# Patient Record
Sex: FEMALE | Race: ASIAN | NOT HISPANIC OR LATINO | URBAN - METROPOLITAN AREA
[De-identification: names, ages, dates, MRNs, and addresses within clinical notes are randomized per-mention and may not be internally consistent; named-entity substitution may affect disease eponyms.]

---

## 2017-05-15 ENCOUNTER — OUTPATIENT (OUTPATIENT)
Dept: OUTPATIENT SERVICES | Facility: HOSPITAL | Age: 42
LOS: 1 days | End: 2017-05-15
Payer: COMMERCIAL

## 2017-05-15 ENCOUNTER — APPOINTMENT (OUTPATIENT)
Dept: MRI IMAGING | Facility: HOSPITAL | Age: 42
End: 2017-05-15

## 2017-05-15 ENCOUNTER — APPOINTMENT (OUTPATIENT)
Dept: INTERVENTIONAL RADIOLOGY/VASCULAR | Facility: HOSPITAL | Age: 42
End: 2017-05-15

## 2017-05-15 DIAGNOSIS — D25.9 LEIOMYOMA OF UTERUS, UNSPECIFIED: ICD-10-CM

## 2017-05-15 DIAGNOSIS — Z80.3 FAMILY HISTORY OF MALIGNANT NEOPLASM OF BREAST: ICD-10-CM

## 2017-05-15 DIAGNOSIS — Z87.19 PERSONAL HISTORY OF OTHER DISEASES OF THE DIGESTIVE SYSTEM: ICD-10-CM

## 2017-05-15 DIAGNOSIS — Z78.9 OTHER SPECIFIED HEALTH STATUS: ICD-10-CM

## 2017-05-15 DIAGNOSIS — I10 ESSENTIAL (PRIMARY) HYPERTENSION: ICD-10-CM

## 2017-05-15 DIAGNOSIS — K26.3 ACUTE DUODENAL ULCER W/OUT HEMORRHAGE OR PERFORATION: ICD-10-CM

## 2017-05-15 DIAGNOSIS — Z98.89 OTHER SPECIFIED POSTPROCEDURAL STATES: Chronic | ICD-10-CM

## 2017-05-15 DIAGNOSIS — M54.2 CERVICALGIA: ICD-10-CM

## 2017-05-15 DIAGNOSIS — N83.20 UNSPECIFIED OVARIAN CYSTS: Chronic | ICD-10-CM

## 2017-05-15 DIAGNOSIS — Z87.09 PERSONAL HISTORY OF OTHER DISEASES OF THE RESPIRATORY SYSTEM: ICD-10-CM

## 2017-05-15 DIAGNOSIS — Z87.42 PERSONAL HISTORY OF OTHER DISEASES OF THE FEMALE GENITAL TRACT: ICD-10-CM

## 2017-05-15 DIAGNOSIS — Z82.49 FAMILY HISTORY OF ISCHEMIC HEART DISEASE AND OTHER DISEASES OF THE CIRCULATORY SYSTEM: ICD-10-CM

## 2017-05-15 DIAGNOSIS — Z86.018 PERSONAL HISTORY OF OTHER BENIGN NEOPLASM: ICD-10-CM

## 2017-05-15 PROCEDURE — 72197 MRI PELVIS W/O & W/DYE: CPT | Mod: 26

## 2017-05-15 PROCEDURE — 72197 MRI PELVIS W/O & W/DYE: CPT

## 2017-05-15 RX ORDER — NAPROXEN 500 MG/1
500 TABLET ORAL
Refills: 0 | Status: ACTIVE | COMMUNITY

## 2017-05-15 RX ORDER — RANITIDINE HYDROCHLORIDE 300 MG/1
300 TABLET, FILM COATED ORAL
Refills: 0 | Status: ACTIVE | COMMUNITY

## 2017-05-15 RX ORDER — HYDROXYZINE HCL 25 MG
25 TABLET ORAL
Refills: 0 | Status: ACTIVE | COMMUNITY

## 2017-05-15 RX ORDER — GLUCOSAMINE SULFATE 500 MG
500 CAPSULE ORAL
Refills: 0 | Status: ACTIVE | COMMUNITY

## 2017-05-15 RX ORDER — HYDROCHLOROTHIAZIDE 25 MG/1
25 TABLET ORAL DAILY
Refills: 0 | Status: ACTIVE | COMMUNITY

## 2017-05-15 RX ORDER — CETIRIZINE HCL 10 MG
10 TABLET ORAL
Refills: 0 | Status: ACTIVE | COMMUNITY

## 2017-05-15 RX ORDER — NIFEDIPINE 90 MG/1
90 TABLET, EXTENDED RELEASE ORAL
Refills: 0 | Status: ACTIVE | COMMUNITY

## 2017-05-15 RX ORDER — ESOMEPRAZOLE MAGNESIUM 40 MG/1
40 CAPSULE, DELAYED RELEASE ORAL
Refills: 0 | Status: ACTIVE | COMMUNITY

## 2017-05-15 RX ORDER — CHLORTHALIDONE 25 MG/1
25 TABLET ORAL
Refills: 0 | Status: ACTIVE | COMMUNITY

## 2017-05-15 RX ORDER — METOPROLOL SUCCINATE 50 MG/1
50 TABLET, EXTENDED RELEASE ORAL
Refills: 0 | Status: ACTIVE | COMMUNITY

## 2017-06-13 ENCOUNTER — LABORATORY RESULT (OUTPATIENT)
Age: 42
End: 2017-06-13

## 2017-06-15 ENCOUNTER — OUTPATIENT (OUTPATIENT)
Dept: OUTPATIENT SERVICES | Facility: HOSPITAL | Age: 42
LOS: 1 days | End: 2017-06-15
Payer: COMMERCIAL

## 2017-06-15 ENCOUNTER — APPOINTMENT (OUTPATIENT)
Dept: INTERVENTIONAL RADIOLOGY/VASCULAR | Facility: HOSPITAL | Age: 42
End: 2017-06-15

## 2017-06-15 DIAGNOSIS — Z98.89 OTHER SPECIFIED POSTPROCEDURAL STATES: Chronic | ICD-10-CM

## 2017-06-15 DIAGNOSIS — D25.9 LEIOMYOMA OF UTERUS, UNSPECIFIED: ICD-10-CM

## 2017-06-15 DIAGNOSIS — N83.20 UNSPECIFIED OVARIAN CYSTS: Chronic | ICD-10-CM

## 2017-06-15 PROCEDURE — C1769: CPT

## 2017-06-15 PROCEDURE — 75774 ARTERY X-RAY EACH VESSEL: CPT

## 2017-06-15 PROCEDURE — 36200 PLACE CATHETER IN AORTA: CPT

## 2017-06-15 PROCEDURE — C1894: CPT

## 2017-06-15 PROCEDURE — 76937 US GUIDE VASCULAR ACCESS: CPT

## 2017-06-15 PROCEDURE — 75894 X-RAYS TRANSCATH THERAPY: CPT

## 2017-06-15 PROCEDURE — C1887: CPT

## 2017-06-15 PROCEDURE — C1760: CPT

## 2017-06-15 PROCEDURE — 76000 FLUOROSCOPY <1 HR PHYS/QHP: CPT

## 2017-06-15 RX ORDER — ONDANSETRON 8 MG/1
1 TABLET, FILM COATED ORAL
Qty: 16 | Refills: 0 | OUTPATIENT
Start: 2017-06-15

## 2017-06-15 RX ORDER — KETOROLAC TROMETHAMINE 30 MG/ML
1 SYRINGE (ML) INJECTION
Qty: 20 | Refills: 0 | OUTPATIENT
Start: 2017-06-15 | End: 2017-06-20

## 2017-06-15 RX ORDER — ONDANSETRON 8 MG/1
1 TABLET, FILM COATED ORAL
Qty: 20 | Refills: 0 | OUTPATIENT
Start: 2017-06-15 | End: 2017-06-20

## 2017-06-15 RX ORDER — OXYCODONE HYDROCHLORIDE 5 MG/1
1 TABLET ORAL
Qty: 30 | Refills: 0 | OUTPATIENT
Start: 2017-06-15 | End: 2017-06-20

## 2017-06-15 RX ORDER — OXYCODONE HYDROCHLORIDE 5 MG/1
1 TABLET ORAL
Qty: 20 | Refills: 0 | OUTPATIENT
Start: 2017-06-15

## 2017-06-15 NOTE — ASU DISCHARGE PLAN (ADULT/PEDIATRIC). - INSTRUCTIONS
Drink plenty of fluid over the next several days to prevent constipation. If constipation then take Colace

## 2017-06-15 NOTE — ASU DISCHARGE PLAN (ADULT/PEDIATRIC). - MEDICATION SUMMARY - MEDICATIONS TO TAKE
I will START or STAY ON the medications listed below when I get home from the hospital:    Fioricet oral tablet  -- 1 tab(s) by mouth every 12 hours  -- Indication: For as directed     oxyCODONE 5 mg oral tablet  -- 1 tab(s) by mouth every 4 hours, As Needed -for moderate pain MDD:6  -- Caution federal law prohibits the transfer of this drug to any person other  than the person for whom it was prescribed.  It is very important that you take or use this exactly as directed.  Do not skip doses or discontinue unless directed by your doctor.  May cause drowsiness.  Alcohol may intensify this effect.  Use care when operating dangerous machinery.  This prescription cannot be refilled.  Using more of this medication than prescribed may cause serious breathing problems.    -- Indication: For s/p uterine fibroid embolisation for moderate pain     ketorolac 10 mg oral tablet  -- 1 tab(s) by mouth every 6 hours MDD:4  -- It is very important that you take or use this exactly as directed.  Do not skip doses or discontinue unless directed by your doctor.  May cause drowsiness or dizziness.  Obtain medical advice before taking any non-prescription drugs as some may affect the action of this medication.  Take with food or milk.    -- Indication: For for moderate pain     ondansetron 4 mg oral tablet, disintegrating  -- 1 tab(s) by mouth every 6 hours, As Needed -for nausea MDD:4  -- Indication: For as directed     ALPRAZolam 0.25 mg oral tablet  -- 1 tab(s) by mouth every 12 hours, As Needed  -- Indication: For as directed     metoprolol tartrate 50 mg oral tablet  -- 1 tab(s) by mouth once a day  -- Indication: For as directed     NIFEdipine 90 mg oral tablet, extended release  -- 1 tab(s) by mouth once a day  -- Indication: For as directed     Zantac 150 oral tablet  -- 1 tab(s) by mouth once a day  -- Indication: For as directed     NexIUM 40 mg oral delayed release capsule  -- 1 cap(s) by mouth once a day  -- Indication: For as directed

## 2017-06-15 NOTE — ASU DISCHARGE PLAN (ADULT/PEDIATRIC). - NOTIFY
Bleeding that does not stop/foul smelling vaginal discharge , pelvic pain if not relieved by meds/Increased Irritability or Sluggishness/GYN Fever>100.4/Numbness, color, or temperature change to extremity/Pain not relieved by Medications/Persistent Nausea and Vomiting/Fever greater than 101/Unable to Urinate

## 2017-06-15 NOTE — ASU DISCHARGE PLAN (ADULT/PEDIATRIC). - ACTIVITY LEVEL
no intercourse/no tampons/no douching/no weight bearing/nothing per vagina/no exercise/no tub baths/no heavy lifting/for 4 weeks

## 2017-06-15 NOTE — ASU DISCHARGE PLAN (ADULT/PEDIATRIC). - SPECIAL INSTRUCTIONS
pelvic pain should gradually improve over the next 4-7 days but may continue for a few weeks. low grade fever, night sweats , irregular bleeding, passage of tissue and non- foul smelling vaginal discharge are common following UAE. Some women skip a period or two after the procedure or have mid cycle vaginal bleeding. By the time you return for your four month follow up visit with IR your cycles will have likely returned to normal

## 2017-06-15 NOTE — ASU DISCHARGE PLAN (ADULT/PEDIATRIC). - ITEMS TO FOLLOWUP WITH YOUR PHYSICIAN'S
please follow up with GYN in one months after Uterine embolisation procedure. Please follow up with IR,  Dr. Raisa pereira 4 months after the procedure to assess your progress and address any issues you may have . You can schedule this visit by calling us at .

## 2017-07-07 RX ORDER — OXYCODONE HYDROCHLORIDE 5 MG/1
1 TABLET ORAL
Qty: 10 | Refills: 0 | OUTPATIENT
Start: 2017-07-07

## 2017-07-07 RX ORDER — OXYCODONE HYDROCHLORIDE 5 MG/1
1 TABLET ORAL
Qty: 10 | Refills: 0 | OUTPATIENT
Start: 2017-07-07 | End: 2017-07-10

## 2020-01-02 ENCOUNTER — EMERGENCY (EMERGENCY)
Facility: HOSPITAL | Age: 45
LOS: 1 days | Discharge: HOME | End: 2020-01-02
Attending: EMERGENCY MEDICINE
Payer: COMMERCIAL

## 2020-01-02 VITALS
WEIGHT: 117.95 LBS | SYSTOLIC BLOOD PRESSURE: 118 MMHG | TEMPERATURE: 98 F | RESPIRATION RATE: 17 BRPM | OXYGEN SATURATION: 99 % | DIASTOLIC BLOOD PRESSURE: 79 MMHG | HEART RATE: 102 BPM | HEIGHT: 59 IN

## 2020-01-02 VITALS
SYSTOLIC BLOOD PRESSURE: 124 MMHG | TEMPERATURE: 98 F | OXYGEN SATURATION: 100 % | HEART RATE: 88 BPM | RESPIRATION RATE: 19 BRPM | DIASTOLIC BLOOD PRESSURE: 70 MMHG

## 2020-01-02 DIAGNOSIS — Z98.89 OTHER SPECIFIED POSTPROCEDURAL STATES: Chronic | ICD-10-CM

## 2020-01-02 DIAGNOSIS — N83.20 UNSPECIFIED OVARIAN CYSTS: Chronic | ICD-10-CM

## 2020-01-02 LAB
ALBUMIN SERPL ELPH-MCNC: 4.2 G/DL — SIGNIFICANT CHANGE UP (ref 3.5–5)
ALP SERPL-CCNC: 126 U/L — HIGH (ref 40–120)
ALT FLD-CCNC: 85 U/L DA — HIGH (ref 10–60)
ANION GAP SERPL CALC-SCNC: 8 MMOL/L — SIGNIFICANT CHANGE UP (ref 5–17)
ANISOCYTOSIS BLD QL: SIGNIFICANT CHANGE UP
APPEARANCE UR: CLEAR — SIGNIFICANT CHANGE UP
AST SERPL-CCNC: 45 U/L — HIGH (ref 10–40)
BACTERIA # UR AUTO: ABNORMAL /HPF
BASOPHILS # BLD AUTO: 0 K/UL — SIGNIFICANT CHANGE UP (ref 0–0.2)
BASOPHILS # BLD AUTO: 0.05 K/UL — SIGNIFICANT CHANGE UP (ref 0–0.2)
BASOPHILS NFR BLD AUTO: 0 % — SIGNIFICANT CHANGE UP (ref 0–2)
BASOPHILS NFR BLD AUTO: 0.3 % — SIGNIFICANT CHANGE UP (ref 0–2)
BILIRUB SERPL-MCNC: 0.7 MG/DL — SIGNIFICANT CHANGE UP (ref 0.2–1.2)
BILIRUB UR-MCNC: NEGATIVE — SIGNIFICANT CHANGE UP
BUN SERPL-MCNC: 15 MG/DL — SIGNIFICANT CHANGE UP (ref 7–18)
CALCIUM SERPL-MCNC: 9.5 MG/DL — SIGNIFICANT CHANGE UP (ref 8.4–10.5)
CHLORIDE SERPL-SCNC: 98 MMOL/L — SIGNIFICANT CHANGE UP (ref 96–108)
CO2 SERPL-SCNC: 28 MMOL/L — SIGNIFICANT CHANGE UP (ref 22–31)
COLOR SPEC: YELLOW — SIGNIFICANT CHANGE UP
CREAT SERPL-MCNC: 0.93 MG/DL — SIGNIFICANT CHANGE UP (ref 0.5–1.3)
DIFF PNL FLD: NEGATIVE — SIGNIFICANT CHANGE UP
ELLIPTOCYTES BLD QL SMEAR: SLIGHT — SIGNIFICANT CHANGE UP
EOSINOPHIL # BLD AUTO: 0 K/UL — SIGNIFICANT CHANGE UP (ref 0–0.5)
EOSINOPHIL # BLD AUTO: 0.01 K/UL — SIGNIFICANT CHANGE UP (ref 0–0.5)
EOSINOPHIL NFR BLD AUTO: 0 % — SIGNIFICANT CHANGE UP (ref 0–6)
EOSINOPHIL NFR BLD AUTO: 0.1 % — SIGNIFICANT CHANGE UP (ref 0–6)
EPI CELLS # UR: SIGNIFICANT CHANGE UP /HPF
GLUCOSE SERPL-MCNC: 94 MG/DL — SIGNIFICANT CHANGE UP (ref 70–99)
GLUCOSE UR QL: NEGATIVE — SIGNIFICANT CHANGE UP
HCG UR QL: NEGATIVE — SIGNIFICANT CHANGE UP
HCT VFR BLD CALC: 38.4 % — SIGNIFICANT CHANGE UP (ref 34.5–45)
HCT VFR BLD CALC: 43.7 % — SIGNIFICANT CHANGE UP (ref 34.5–45)
HGB BLD-MCNC: 12.3 G/DL — SIGNIFICANT CHANGE UP (ref 11.5–15.5)
HGB BLD-MCNC: 13.8 G/DL — SIGNIFICANT CHANGE UP (ref 11.5–15.5)
HYALINE CASTS # UR AUTO: ABNORMAL /LPF
HYPOCHROMIA BLD QL: SLIGHT — SIGNIFICANT CHANGE UP
IMM GRANULOCYTES NFR BLD AUTO: 0.5 % — SIGNIFICANT CHANGE UP (ref 0–1.5)
KETONES UR-MCNC: NEGATIVE — SIGNIFICANT CHANGE UP
LEUKOCYTE ESTERASE UR-ACNC: NEGATIVE — SIGNIFICANT CHANGE UP
LIDOCAIN IGE QN: 162 U/L — SIGNIFICANT CHANGE UP (ref 73–393)
LYMPHOCYTES # BLD AUTO: 0.89 K/UL — LOW (ref 1–3.3)
LYMPHOCYTES # BLD AUTO: 1.76 K/UL — SIGNIFICANT CHANGE UP (ref 1–3.3)
LYMPHOCYTES # BLD AUTO: 5.1 % — LOW (ref 13–44)
LYMPHOCYTES # BLD AUTO: 8 % — LOW (ref 13–44)
MANUAL SMEAR VERIFICATION: SIGNIFICANT CHANGE UP
MCHC RBC-ENTMCNC: 21.1 PG — LOW (ref 27–34)
MCHC RBC-ENTMCNC: 21.1 PG — LOW (ref 27–34)
MCHC RBC-ENTMCNC: 31.6 GM/DL — LOW (ref 32–36)
MCHC RBC-ENTMCNC: 32 GM/DL — SIGNIFICANT CHANGE UP (ref 32–36)
MCV RBC AUTO: 65.9 FL — LOW (ref 80–100)
MCV RBC AUTO: 66.8 FL — LOW (ref 80–100)
MICROCYTES BLD QL: SIGNIFICANT CHANGE UP
MONOCYTES # BLD AUTO: 0 K/UL — SIGNIFICANT CHANGE UP (ref 0–0.9)
MONOCYTES # BLD AUTO: 0.48 K/UL — SIGNIFICANT CHANGE UP (ref 0–0.9)
MONOCYTES NFR BLD AUTO: 0 % — LOW (ref 2–14)
MONOCYTES NFR BLD AUTO: 2.7 % — SIGNIFICANT CHANGE UP (ref 2–14)
NEUTROPHILS # BLD AUTO: 16.04 K/UL — HIGH (ref 1.8–7.4)
NEUTROPHILS # BLD AUTO: 20.21 K/UL — HIGH (ref 1.8–7.4)
NEUTROPHILS NFR BLD AUTO: 91.3 % — HIGH (ref 43–77)
NEUTROPHILS NFR BLD AUTO: 92 % — HIGH (ref 43–77)
NITRITE UR-MCNC: NEGATIVE — SIGNIFICANT CHANGE UP
NRBC # BLD: 0 /100 WBCS — SIGNIFICANT CHANGE UP (ref 0–0)
NRBC # BLD: 0 /100 — SIGNIFICANT CHANGE UP (ref 0–0)
PH UR: 6 — SIGNIFICANT CHANGE UP (ref 5–8)
PLAT MORPH BLD: NORMAL — SIGNIFICANT CHANGE UP
PLATELET # BLD AUTO: 167 K/UL — SIGNIFICANT CHANGE UP (ref 150–400)
PLATELET # BLD AUTO: 185 K/UL — SIGNIFICANT CHANGE UP (ref 150–400)
POTASSIUM SERPL-MCNC: 3 MMOL/L — LOW (ref 3.5–5.3)
POTASSIUM SERPL-SCNC: 3 MMOL/L — LOW (ref 3.5–5.3)
PROT SERPL-MCNC: 8.6 G/DL — HIGH (ref 6–8.3)
PROT UR-MCNC: 15
RBC # BLD: 5.83 M/UL — HIGH (ref 3.8–5.2)
RBC # BLD: 6.54 M/UL — HIGH (ref 3.8–5.2)
RBC # FLD: 16.4 % — HIGH (ref 10.3–14.5)
RBC # FLD: 17.8 % — HIGH (ref 10.3–14.5)
RBC BLD AUTO: ABNORMAL
RBC CASTS # UR COMP ASSIST: SIGNIFICANT CHANGE UP /HPF (ref 0–2)
SODIUM SERPL-SCNC: 134 MMOL/L — LOW (ref 135–145)
SP GR SPEC: 1.01 — SIGNIFICANT CHANGE UP (ref 1.01–1.02)
SPHEROCYTES BLD QL SMEAR: SLIGHT — SIGNIFICANT CHANGE UP
TARGETS BLD QL SMEAR: SLIGHT — SIGNIFICANT CHANGE UP
TRIGL SERPL-MCNC: 116 MG/DL — SIGNIFICANT CHANGE UP (ref 10–149)
UROBILINOGEN FLD QL: NEGATIVE — SIGNIFICANT CHANGE UP
WBC # BLD: 17.56 K/UL — HIGH (ref 3.8–10.5)
WBC # BLD: 21.97 K/UL — HIGH (ref 3.8–10.5)
WBC # FLD AUTO: 17.56 K/UL — HIGH (ref 3.8–10.5)
WBC # FLD AUTO: 21.97 K/UL — HIGH (ref 3.8–10.5)
WBC UR QL: SIGNIFICANT CHANGE UP /HPF (ref 0–5)

## 2020-01-02 PROCEDURE — 84478 ASSAY OF TRIGLYCERIDES: CPT

## 2020-01-02 PROCEDURE — 81001 URINALYSIS AUTO W/SCOPE: CPT

## 2020-01-02 PROCEDURE — 81025 URINE PREGNANCY TEST: CPT

## 2020-01-02 PROCEDURE — 99284 EMERGENCY DEPT VISIT MOD MDM: CPT

## 2020-01-02 PROCEDURE — 36415 COLL VENOUS BLD VENIPUNCTURE: CPT

## 2020-01-02 PROCEDURE — 83690 ASSAY OF LIPASE: CPT

## 2020-01-02 PROCEDURE — 99284 EMERGENCY DEPT VISIT MOD MDM: CPT | Mod: 25

## 2020-01-02 PROCEDURE — 74177 CT ABD & PELVIS W/CONTRAST: CPT | Mod: 26

## 2020-01-02 PROCEDURE — 85027 COMPLETE CBC AUTOMATED: CPT

## 2020-01-02 PROCEDURE — 96360 HYDRATION IV INFUSION INIT: CPT | Mod: XU

## 2020-01-02 PROCEDURE — 80053 COMPREHEN METABOLIC PANEL: CPT

## 2020-01-02 PROCEDURE — 74177 CT ABD & PELVIS W/CONTRAST: CPT

## 2020-01-02 RX ORDER — IOHEXOL 300 MG/ML
30 INJECTION, SOLUTION INTRAVENOUS ONCE
Refills: 0 | Status: COMPLETED | OUTPATIENT
Start: 2020-01-02 | End: 2020-01-02

## 2020-01-02 RX ORDER — SODIUM CHLORIDE 9 MG/ML
1000 INJECTION INTRAMUSCULAR; INTRAVENOUS; SUBCUTANEOUS ONCE
Refills: 0 | Status: COMPLETED | OUTPATIENT
Start: 2020-01-02 | End: 2020-01-02

## 2020-01-02 RX ORDER — ACETAMINOPHEN 500 MG
650 TABLET ORAL ONCE
Refills: 0 | Status: COMPLETED | OUTPATIENT
Start: 2020-01-02 | End: 2020-01-02

## 2020-01-02 RX ADMIN — Medication 650 MILLIGRAM(S): at 20:23

## 2020-01-02 RX ADMIN — Medication 650 MILLIGRAM(S): at 20:53

## 2020-01-02 RX ADMIN — SODIUM CHLORIDE 1000 MILLILITER(S): 9 INJECTION INTRAMUSCULAR; INTRAVENOUS; SUBCUTANEOUS at 19:54

## 2020-01-02 RX ADMIN — SODIUM CHLORIDE 1000 MILLILITER(S): 9 INJECTION INTRAMUSCULAR; INTRAVENOUS; SUBCUTANEOUS at 18:54

## 2020-01-02 RX ADMIN — IOHEXOL 30 MILLILITER(S): 300 INJECTION, SOLUTION INTRAVENOUS at 18:54

## 2020-01-02 NOTE — ED PROVIDER NOTE - PMH
Duodenal ulcer    Endometriosis    Hypertension    Migraine    PID (acute pelvic inflammatory disease)  4/2015  Transaminitis  4/2015  Uterine leiomyoma

## 2020-01-02 NOTE — ED PROVIDER NOTE - OBJECTIVE STATEMENT
44 y.o female with a PMHx of fibroids, HTN, Endometriosis, ovarian cysts, and no PSHx presents to the ED c/o RLQ pain and chills, nausea, vomiting, and fever. Patient was sent in by PCP , Dr. Edwards for evaluation. Patient denies any other acute complaints. Allergies :Bactrim : Hives

## 2020-01-02 NOTE — ED ADULT NURSE NOTE - NSIMPLEMENTINTERV_GEN_ALL_ED
Implemented All Universal Safety Interventions:  [place] to call system. Call bell, personal items and telephone within reach. Instruct patient to call for assistance. Room bathroom lighting operational. Non-slip footwear when patient is off stretcher. Physically safe environment: no spills, clutter or unnecessary equipment. Stretcher in lowest position, wheels locked, appropriate side rails in place.

## 2020-01-02 NOTE — ED PROVIDER NOTE - PATIENT PORTAL LINK FT
You can access the FollowMyHealth Patient Portal offered by [facility] by registering at the following website: [email or web address]. By joining [facility]’s FollowMyHealth portal, you will also be able to view your health information using other applications (apps) compatible with our system.

## 2020-02-10 ENCOUNTER — APPOINTMENT (OUTPATIENT)
Dept: MRI IMAGING | Facility: HOSPITAL | Age: 45
End: 2020-02-10
Payer: COMMERCIAL

## 2020-02-10 ENCOUNTER — OUTPATIENT (OUTPATIENT)
Dept: OUTPATIENT SERVICES | Facility: HOSPITAL | Age: 45
LOS: 1 days | End: 2020-02-10
Payer: COMMERCIAL

## 2020-02-10 DIAGNOSIS — N83.20 UNSPECIFIED OVARIAN CYSTS: Chronic | ICD-10-CM

## 2020-02-10 DIAGNOSIS — D25.9 LEIOMYOMA OF UTERUS, UNSPECIFIED: ICD-10-CM

## 2020-02-10 DIAGNOSIS — Z98.89 OTHER SPECIFIED POSTPROCEDURAL STATES: Chronic | ICD-10-CM

## 2020-02-10 DIAGNOSIS — J84.10 PULMONARY FIBROSIS, UNSPECIFIED: ICD-10-CM

## 2020-02-10 PROCEDURE — 72197 MRI PELVIS W/O & W/DYE: CPT

## 2020-02-10 PROCEDURE — 72197 MRI PELVIS W/O & W/DYE: CPT | Mod: 26

## 2020-05-15 ENCOUNTER — TRANSCRIPTION ENCOUNTER (OUTPATIENT)
Age: 45
End: 2020-05-15

## 2022-06-24 ENCOUNTER — EMERGENCY (EMERGENCY)
Facility: HOSPITAL | Age: 47
LOS: 1 days | Discharge: HOME | End: 2022-06-24
Attending: EMERGENCY MEDICINE
Payer: COMMERCIAL

## 2022-06-24 VITALS
HEART RATE: 95 BPM | DIASTOLIC BLOOD PRESSURE: 82 MMHG | HEIGHT: 59 IN | RESPIRATION RATE: 19 BRPM | TEMPERATURE: 98 F | WEIGHT: 130.07 LBS | SYSTOLIC BLOOD PRESSURE: 121 MMHG | OXYGEN SATURATION: 96 %

## 2022-06-24 VITALS
HEART RATE: 75 BPM | OXYGEN SATURATION: 99 % | DIASTOLIC BLOOD PRESSURE: 80 MMHG | RESPIRATION RATE: 18 BRPM | SYSTOLIC BLOOD PRESSURE: 128 MMHG | TEMPERATURE: 98 F

## 2022-06-24 DIAGNOSIS — N83.20 UNSPECIFIED OVARIAN CYSTS: Chronic | ICD-10-CM

## 2022-06-24 DIAGNOSIS — Z98.89 OTHER SPECIFIED POSTPROCEDURAL STATES: Chronic | ICD-10-CM

## 2022-06-24 LAB
ALBUMIN SERPL ELPH-MCNC: 3.6 G/DL — SIGNIFICANT CHANGE UP (ref 3.5–5)
ALP SERPL-CCNC: 114 U/L — SIGNIFICANT CHANGE UP (ref 40–120)
ALT FLD-CCNC: 50 U/L DA — SIGNIFICANT CHANGE UP (ref 10–60)
ANION GAP SERPL CALC-SCNC: 7 MMOL/L — SIGNIFICANT CHANGE UP (ref 5–17)
ANISOCYTOSIS BLD QL: SLIGHT — SIGNIFICANT CHANGE UP
APPEARANCE UR: CLEAR — SIGNIFICANT CHANGE UP
AST SERPL-CCNC: 42 U/L — HIGH (ref 10–40)
BACTERIA # UR AUTO: ABNORMAL /HPF
BASOPHILS # BLD AUTO: 0.02 K/UL — SIGNIFICANT CHANGE UP (ref 0–0.2)
BASOPHILS NFR BLD AUTO: 0.2 % — SIGNIFICANT CHANGE UP (ref 0–2)
BILIRUB SERPL-MCNC: 0.6 MG/DL — SIGNIFICANT CHANGE UP (ref 0.2–1.2)
BILIRUB UR-MCNC: NEGATIVE — SIGNIFICANT CHANGE UP
BUN SERPL-MCNC: 11 MG/DL — SIGNIFICANT CHANGE UP (ref 7–18)
CALCIUM SERPL-MCNC: 8.7 MG/DL — SIGNIFICANT CHANGE UP (ref 8.4–10.5)
CHLORIDE SERPL-SCNC: 105 MMOL/L — SIGNIFICANT CHANGE UP (ref 96–108)
CO2 SERPL-SCNC: 26 MMOL/L — SIGNIFICANT CHANGE UP (ref 22–31)
COLOR SPEC: YELLOW — SIGNIFICANT CHANGE UP
COMMENT - URINE: SIGNIFICANT CHANGE UP
CREAT SERPL-MCNC: 0.72 MG/DL — SIGNIFICANT CHANGE UP (ref 0.5–1.3)
DIFF PNL FLD: ABNORMAL
EGFR: 104 ML/MIN/1.73M2 — SIGNIFICANT CHANGE UP
ELLIPTOCYTES BLD QL SMEAR: SLIGHT — SIGNIFICANT CHANGE UP
EOSINOPHIL # BLD AUTO: 0.05 K/UL — SIGNIFICANT CHANGE UP (ref 0–0.5)
EOSINOPHIL NFR BLD AUTO: 0.5 % — SIGNIFICANT CHANGE UP (ref 0–6)
EPI CELLS # UR: ABNORMAL /HPF
FLUAV AG NPH QL: SIGNIFICANT CHANGE UP
FLUBV AG NPH QL: SIGNIFICANT CHANGE UP
GLUCOSE SERPL-MCNC: 105 MG/DL — HIGH (ref 70–99)
GLUCOSE UR QL: NEGATIVE — SIGNIFICANT CHANGE UP
HCG SERPL-ACNC: <1 MIU/ML — SIGNIFICANT CHANGE UP
HCT VFR BLD CALC: 38 % — SIGNIFICANT CHANGE UP (ref 34.5–45)
HGB BLD-MCNC: 11.9 G/DL — SIGNIFICANT CHANGE UP (ref 11.5–15.5)
HYPOCHROMIA BLD QL: SLIGHT — SIGNIFICANT CHANGE UP
IMM GRANULOCYTES NFR BLD AUTO: 0.3 % — SIGNIFICANT CHANGE UP (ref 0–1.5)
KETONES UR-MCNC: ABNORMAL
LEUKOCYTE ESTERASE UR-ACNC: NEGATIVE — SIGNIFICANT CHANGE UP
LIDOCAIN IGE QN: 101 U/L — SIGNIFICANT CHANGE UP (ref 73–393)
LYMPHOCYTES # BLD AUTO: 1.22 K/UL — SIGNIFICANT CHANGE UP (ref 1–3.3)
LYMPHOCYTES # BLD AUTO: 12.4 % — LOW (ref 13–44)
MANUAL SMEAR VERIFICATION: SIGNIFICANT CHANGE UP
MCHC RBC-ENTMCNC: 20.9 PG — LOW (ref 27–34)
MCHC RBC-ENTMCNC: 31.3 GM/DL — LOW (ref 32–36)
MCV RBC AUTO: 66.8 FL — LOW (ref 80–100)
MICROCYTES BLD QL: SLIGHT — SIGNIFICANT CHANGE UP
MONOCYTES # BLD AUTO: 0.42 K/UL — SIGNIFICANT CHANGE UP (ref 0–0.9)
MONOCYTES NFR BLD AUTO: 4.3 % — SIGNIFICANT CHANGE UP (ref 2–14)
NEUTROPHILS # BLD AUTO: 8.07 K/UL — HIGH (ref 1.8–7.4)
NEUTROPHILS NFR BLD AUTO: 82.3 % — HIGH (ref 43–77)
NITRITE UR-MCNC: NEGATIVE — SIGNIFICANT CHANGE UP
NRBC # BLD: 0 /100 WBCS — SIGNIFICANT CHANGE UP (ref 0–0)
OVALOCYTES BLD QL SMEAR: SLIGHT — SIGNIFICANT CHANGE UP
PH UR: 6 — SIGNIFICANT CHANGE UP (ref 5–8)
PLAT MORPH BLD: NORMAL — SIGNIFICANT CHANGE UP
PLATELET # BLD AUTO: 227 K/UL — SIGNIFICANT CHANGE UP (ref 150–400)
PLATELET COUNT - ESTIMATE: NORMAL — SIGNIFICANT CHANGE UP
POIKILOCYTOSIS BLD QL AUTO: SLIGHT — SIGNIFICANT CHANGE UP
POTASSIUM SERPL-MCNC: 4.1 MMOL/L — SIGNIFICANT CHANGE UP (ref 3.5–5.3)
POTASSIUM SERPL-SCNC: 4.1 MMOL/L — SIGNIFICANT CHANGE UP (ref 3.5–5.3)
PROT SERPL-MCNC: 7.7 G/DL — SIGNIFICANT CHANGE UP (ref 6–8.3)
PROT UR-MCNC: 30 MG/DL
RBC # BLD: 5.69 M/UL — HIGH (ref 3.8–5.2)
RBC # FLD: 15.8 % — HIGH (ref 10.3–14.5)
RBC BLD AUTO: ABNORMAL
RBC CASTS # UR COMP ASSIST: ABNORMAL /HPF (ref 0–2)
SARS-COV-2 RNA SPEC QL NAA+PROBE: DETECTED
SODIUM SERPL-SCNC: 138 MMOL/L — SIGNIFICANT CHANGE UP (ref 135–145)
SP GR SPEC: 1.02 — SIGNIFICANT CHANGE UP (ref 1.01–1.02)
UROBILINOGEN FLD QL: 1
WBC # BLD: 9.81 K/UL — SIGNIFICANT CHANGE UP (ref 3.8–10.5)
WBC # FLD AUTO: 9.81 K/UL — SIGNIFICANT CHANGE UP (ref 3.8–10.5)
WBC UR QL: SIGNIFICANT CHANGE UP /HPF (ref 0–5)

## 2022-06-24 PROCEDURE — 93975 VASCULAR STUDY: CPT | Mod: 26,59

## 2022-06-24 PROCEDURE — 76857 US EXAM PELVIC LIMITED: CPT | Mod: 26

## 2022-06-24 PROCEDURE — 81001 URINALYSIS AUTO W/SCOPE: CPT

## 2022-06-24 PROCEDURE — 84702 CHORIONIC GONADOTROPIN TEST: CPT

## 2022-06-24 PROCEDURE — 80053 COMPREHEN METABOLIC PANEL: CPT

## 2022-06-24 PROCEDURE — 36415 COLL VENOUS BLD VENIPUNCTURE: CPT

## 2022-06-24 PROCEDURE — 85025 COMPLETE CBC W/AUTO DIFF WBC: CPT

## 2022-06-24 PROCEDURE — 87637 SARSCOV2&INF A&B&RSV AMP PRB: CPT

## 2022-06-24 PROCEDURE — 93975 VASCULAR STUDY: CPT

## 2022-06-24 PROCEDURE — 76830 TRANSVAGINAL US NON-OB: CPT | Mod: 26

## 2022-06-24 PROCEDURE — 99284 EMERGENCY DEPT VISIT MOD MDM: CPT

## 2022-06-24 PROCEDURE — 99284 EMERGENCY DEPT VISIT MOD MDM: CPT | Mod: 25

## 2022-06-24 PROCEDURE — 96375 TX/PRO/DX INJ NEW DRUG ADDON: CPT

## 2022-06-24 PROCEDURE — 99053 MED SERV 10PM-8AM 24 HR FAC: CPT

## 2022-06-24 PROCEDURE — 76857 US EXAM PELVIC LIMITED: CPT

## 2022-06-24 PROCEDURE — 96374 THER/PROPH/DIAG INJ IV PUSH: CPT

## 2022-06-24 PROCEDURE — 76830 TRANSVAGINAL US NON-OB: CPT

## 2022-06-24 PROCEDURE — 83690 ASSAY OF LIPASE: CPT

## 2022-06-24 RX ORDER — ONDANSETRON 8 MG/1
4 TABLET, FILM COATED ORAL ONCE
Refills: 0 | Status: COMPLETED | OUTPATIENT
Start: 2022-06-24 | End: 2022-06-24

## 2022-06-24 RX ORDER — SODIUM CHLORIDE 9 MG/ML
3 INJECTION INTRAMUSCULAR; INTRAVENOUS; SUBCUTANEOUS EVERY 8 HOURS
Refills: 0 | Status: DISCONTINUED | OUTPATIENT
Start: 2022-06-24 | End: 2022-06-27

## 2022-06-24 RX ORDER — KETOROLAC TROMETHAMINE 30 MG/ML
1 SYRINGE (ML) INJECTION
Qty: 20 | Refills: 0
Start: 2022-06-24

## 2022-06-24 RX ORDER — KETOROLAC TROMETHAMINE 30 MG/ML
30 SYRINGE (ML) INJECTION ONCE
Refills: 0 | Status: DISCONTINUED | OUTPATIENT
Start: 2022-06-24 | End: 2022-06-24

## 2022-06-24 RX ORDER — FAMOTIDINE 10 MG/ML
20 INJECTION INTRAVENOUS ONCE
Refills: 0 | Status: COMPLETED | OUTPATIENT
Start: 2022-06-24 | End: 2022-06-24

## 2022-06-24 RX ADMIN — FAMOTIDINE 20 MILLIGRAM(S): 10 INJECTION INTRAVENOUS at 08:22

## 2022-06-24 RX ADMIN — ONDANSETRON 4 MILLIGRAM(S): 8 TABLET, FILM COATED ORAL at 08:01

## 2022-06-24 RX ADMIN — Medication 30 MILLIGRAM(S): at 08:01

## 2022-06-24 RX ADMIN — Medication 30 MILLIGRAM(S): at 08:30

## 2022-06-24 NOTE — CONSULT NOTE ADULT - ASSESSMENT
46 year old with suspected degenerative fibroid; stable   -no acute gyn intervention   -pain mgmt   -follow up in office for possible myomectomy / hysterectomy   -pt seen with Dr. Huerta

## 2022-06-24 NOTE — ED PROVIDER NOTE - NSICDXPASTMEDICALHX_GEN_ALL_CORE_FT
PAST MEDICAL HISTORY:  Duodenal ulcer     Endometriosis     Hypertension     Migraine     PID (acute pelvic inflammatory disease) 4/2015    Transaminitis 4/2015    Uterine leiomyoma

## 2022-06-24 NOTE — ED PROVIDER NOTE - NSFOLLOWUPINSTRUCTIONS_ED_ALL_ED_FT
Uterine Fibroids       Uterine fibroids, also called leiomyomas, are noncancerous (benign) tumors that can grow in the uterus. They can cause heavy menstrual bleeding and pain. Fibroids may also grow in the fallopian tubes, cervix, or tissues (ligaments) near the uterus.    You may have one or many fibroids. Fibroids vary in size, weight, and where they grow in the uterus. Some can become quite large. Most fibroids do not require medical treatment.      What are the causes?    The cause of this condition is not known.      What increases the risk?    You are more likely to develop this condition if you:  •Are in your 30s or 40s and have not gone through menopause.      •Have a family history of this condition.      •Are of  descent.      •Started your menstrual period at age 10 or younger.      •Have never given birth.      •Are overweight or obese.        What are the signs or symptoms?    Many women do not have any symptoms. Symptoms of this condition may include:  •Heavy menstrual bleeding.      •Bleeding between menstrual periods.      •Pain and pressure in the pelvic area, between your hip bones.      •Pain during sex.      •Bladder problems, such as needing to urinate right away or more often than usual.      •Inability to have children (infertility).      •Failure to carry pregnancy to term (miscarriage).        How is this diagnosed?    This condition may be diagnosed based on:  •Your symptoms and medical history.      •A physical exam.      •A pelvic exam that includes feeling for any tumors.      •Imaging tests, such as ultrasound or MRI.        How is this treated?    Treatment for this condition may include follow-up visits with your health care provider to monitor your fibroids for any changes. Other treatment may include:•Medicines, such as:   •Medicines to relieve pain, including aspirin and NSAIDs, such as ibuprofen or naproxen.      •Hormone therapy. Treatment may be given as a pill or an injection, or it may be inserted into the uterus using an intrauterine device (IUD).      •Surgery that would do one of the following:  •Remove the fibroids (myomectomy). This may be recommended if fibroids affect your fertility and you want to become pregnant.      •Remove the uterus (hysterectomy).      •Block the blood supply to the fibroids (uterine artery embolization). This can cause them to shrink and die.          Follow these instructions at home:    Medicines     •Take over-the-counter and prescription medicines only as told by your health care provider.      •Ask your health care provider if you should take iron pills or eat more iron-rich foods, such as dark green, leafy vegetables. Heavy menstrual bleeding can cause low iron levels.        Managing pain      If directed, apply heat to your back or abdomen to reduce pain. Use the heat source that your health care provider recommends, such as a moist heat pack or a heating pad. To apply heat:  •Place a towel between your skin and the heat source.      •Leave the heat on for 20–30 minutes.      •Remove the heat if your skin turns bright red. This is especially important if you are unable to feel pain, heat, or cold. You may have a greater risk of getting burned.      General instructions   •Pay close attention to your menstrual cycle. Tell your health care provider about any changes, such as:  •Heavier bleeding that requires you to change your pads or tampons more than usual.      •A change in the number of days that your menstrual period lasts.      •A change in symptoms that come with your menstrual period, such as back pain or cramps in your abdomen.        •Keep all follow-up visits. This is important, especially if your fibroids need to be monitored for any changes.        Contact a health care provider if you:    •Have pelvic pain, back pain, or cramps in your abdomen that do not get better with medicine or heat.      •Develop new bleeding between menstrual periods.      •Have increased bleeding during or between menstrual periods.      •Feel more tired or weak than usual.      •Feel light-headed.        Get help right away if you:    •Faint.      •Have pelvic pain that suddenly gets worse.      •Have severe vaginal bleeding that soaks a tampon or pad in 30 minutes or less.        Summary    •Uterine fibroids are noncancerous (benign) tumors that can develop in the uterus.      •The exact cause of this condition is not known.      •Most fibroids do not require medical treatment unless they affect your ability to have children (fertility).      •Contact a health care provider if you have pelvic pain, back pain, or cramps in your abdomen that do not get better with medicines.      •Get help right away if you faint, have pelvic pain that suddenly gets worse, or have severe vaginal bleeding.      This information is not intended to replace advice given to you by your health care provider. Make sure you discuss any questions you have with your health care provider.      Document Revised: 07/20/2021 Document Reviewed: 07/20/2021    Elsevier Patient Education © 2022 [employer]

## 2022-06-24 NOTE — ED PROVIDER NOTE - NSICDXFAMILYHX_GEN_ALL_CORE_FT
FAMILY HISTORY:  Father  Still living? No  Diabetes mellitus, type 2, Age at diagnosis: Age Unknown  Family history of hypertension, Age at diagnosis: Age Unknown    Mother  Still living? No  Family history of breast cancer, Age at diagnosis: Age Unknown  Family history of uterine cancer, Age at diagnosis: Age Unknown

## 2022-06-24 NOTE — ED PROVIDER NOTE - DISCHARGE DATE
24-Jun-2022 Oxybutynin Counseling:  I discussed with the patient the risks of oxybutynin including but not limited to skin rash, drowsiness, dry mouth, difficulty urinating, and blurred vision.

## 2022-06-24 NOTE — ED PROVIDER NOTE - PATIENT PORTAL LINK FT
You can access the FollowMyHealth Patient Portal offered by [facility] by registering at the following website: [email or web address]. By joining [facility]’s FollowMyHealth portal, you will also be able to view your health information using other applications (apps) compatible with our system.

## 2022-06-24 NOTE — ED ADULT NURSE NOTE - OBJECTIVE STATEMENT
patient presents to ED with c/o right lower abdominal pain with vomiting since this morning with difficulty urinating.

## 2022-06-24 NOTE — ED PROVIDER NOTE - OBJECTIVE STATEMENT
47 y/o female presents with right lower pelvic pain since yesterday.  As per patient, this feels like her fibroid pain.  Pt with PMHx of HTN, endometriosos, and fibroids.  Pt also history of uterine embolization for fibroids.  Pt has been taking motrin for her pain with some relief initially, however patient says pain is not responding to meds.  No vomiting, no diarrhea, pt denies vaginal discharge or urinary symptoms.

## 2022-06-24 NOTE — ED PROVIDER NOTE - CLINICAL SUMMARY MEDICAL DECISION MAKING FREE TEXT BOX
pt with history of uterine fibroids presents with right pelvic pain.  Likely fibroid pain, r/o torsion.  labs, UA, sono, supportive care and reassess.

## 2022-06-24 NOTE — CONSULT NOTE ADULT - SUBJECTIVE AND OBJECTIVE BOX
46 year old , LMP 22 presents to ER with complaint of worsening abdominal pain. Pt states she started having pain on wednesday and worsened yesterday. Pt states she took tylenol and motrin with brief relief in symptoms. Pt also has associated gerd and nausea. Pt denies chest pain, dizzness, sob, palpitations, increased vaginal bleeding.     OBGYN: Dr. Stroud, last seen 2 years ago   Pob/gynHx: hx of  x 1   hx of fibroids s/p d&c and embolization   hx of ovarian cyst s/p rupture s/p ex lap   menses regular with normal flow   Allergies: Bactrim (hives)   Meds: nifedipine, metoprolol, statin   PMHx: HLD, HTN, PID, transaminitis GERD  PSHx: H/O breast biopsy, Left Breast   Ovarian cyst ex lap, Removed   d&c, embolization    REVIEW OF SYSTEMS  see HPI     FAMILY HISTORY:  Family history of hypertension (Father)    Diabetes mellitus, type 2 (Father)    Family history of breast cancer (Mother)    Family history of uterine cancer (Mother)        Vital Signs Last 24 Hrs  T(C): 36.7 (2022 11:06), Max: 36.9 (2022 06:54)  T(F): 98.1 (2022 11:06), Max: 98.5 (2022 06:54)  HR: 75 (2022 11:06) (75 - 95)  BP: 128/80 (2022 11:06) (121/82 - 128/80)  BP(mean): --  RR: 18 (2022 11:06) (18 - 19)  SpO2: 99% (2022 11:06) (96% - 99%)    PHYSICAL EXAM:    Gen: A&O x 3, NAD  Chest: CTA B/L  Cardiac: S1,S2  RRR  Abdomen: +BS; soft; tenderness to mid abdomen, palpable mass above umbilicus, nondistended, no rebound or guarding   Gyn: no bleeding, no pooling, mild yellow discharge, no CMT, no AT, no abnormal masses   Extremities: Nontender, no edema    LABS:                        11.9   9.81  )-----------( 227      ( 2022 07:38 )             38.0     06-24    138  |  105  |  11  ----------------------------<  105<H>  4.1   |  26  |  0.72    Ca    8.7      2022 07:39    TPro  7.7  /  Alb  3.6  /  TBili  0.6  /  DBili  x   /  AST  42<H>  /  ALT  50  /  AlkPhos  114        Urinalysis Basic - ( 2022 11:35 )    Color: Yellow / Appearance: Clear / S.020 / pH: x  Gluc: x / Ketone: Moderate  / Bili: Negative / Urobili: 1   Blood: x / Protein: 30 mg/dL / Nitrite: Negative   Leuk Esterase: Negative / RBC: 2-5 /HPF / WBC 0-2 /HPF   Sq Epi: x / Non Sq Epi: Occasional /HPF / Bacteria: Moderate /HPF        RADIOLOGY & ADDITIONAL STUDIES:  < from: US Pelvis Limited or Follow Up (22 @ 11:01) >  INTERPRETATION: CLINICAL INFORMATION: Right pelvic pain. Evaluate for   torsion. Prior UFE .    LMP: 2022  HCG negative 2022.    COMPARISON: Pelvic MRI to 10/20/2020    TECHNIQUE:  Endovaginal and transabdominal pelvic sonogram. Color and Spectral   Doppler was performed.    FINDINGS:    Uterus measures 12.2 x 8.1 x 9.3 cm with markedly heterogeneous   echotexture and numerous fibroids. Fibroid with peripheral rim of   echogenicity measures 5.1 x 4.5 x 4.5 cm. Additional fibroids measure 3.2   x 2.9 x 3.8 cm and 2.5 x 2.6 x 2.9 cm. A pedunculated fibroid extending   towards the right adnexa is redemonstrated measuring 9.1 x 8.9 x 8.9 cm,   previously measured as 7.7 x 4.7 x 6.7 cm on MRI dated 2/10/2020. The   endometrium is not well delineated due to distortion from presence of   numerous fibroids.    Right ovary is not visualized due to shadowing artifact from right sided   pedunculated fibroid.    Left ovary measures 2.7 x 1.5 x 2.9 cm with small internal follicle   measuring 1.2 cm. Good vascular flow the left ovary.    No free fluid.    IMPRESSION:    Myomatous uterus with redemonstrated large pedunculated fibroid extending   to the right adnexal region. Fibroid degeneration versus torsion cannot   be excluded as a cause of patient's pain.    Right ovary is not visualized on this study due to artifact from the   right-sided pedunculated fibroid.    Correlate with GYN evaluation and contrast enhanced pelvic MRI for   characterization.    Findings discussed with Dr. Bey from ER on 2022 at 11:23 AM.    --- End of Report ---    < end of copied text >
